# Patient Record
Sex: FEMALE | Race: OTHER | HISPANIC OR LATINO
[De-identification: names, ages, dates, MRNs, and addresses within clinical notes are randomized per-mention and may not be internally consistent; named-entity substitution may affect disease eponyms.]

---

## 2021-08-30 PROBLEM — Z00.00 ENCOUNTER FOR PREVENTIVE HEALTH EXAMINATION: Status: ACTIVE | Noted: 2021-08-30

## 2021-09-01 ENCOUNTER — APPOINTMENT (OUTPATIENT)
Dept: ANTEPARTUM | Facility: CLINIC | Age: 26
End: 2021-09-01
Payer: MEDICAID

## 2021-09-01 ENCOUNTER — ASOB RESULT (OUTPATIENT)
Age: 26
End: 2021-09-01

## 2021-09-01 ENCOUNTER — APPOINTMENT (OUTPATIENT)
Dept: ANTEPARTUM | Facility: CLINIC | Age: 26
End: 2021-09-01

## 2021-09-01 PROCEDURE — 76815 OB US LIMITED FETUS(S): CPT

## 2021-09-29 ENCOUNTER — ASOB RESULT (OUTPATIENT)
Age: 26
End: 2021-09-29

## 2021-09-29 ENCOUNTER — APPOINTMENT (OUTPATIENT)
Dept: ANTEPARTUM | Facility: CLINIC | Age: 26
End: 2021-09-29
Payer: MEDICAID

## 2021-09-29 PROCEDURE — 76811 OB US DETAILED SNGL FETUS: CPT

## 2021-09-29 PROCEDURE — 76817 TRANSVAGINAL US OBSTETRIC: CPT

## 2021-12-22 ENCOUNTER — APPOINTMENT (OUTPATIENT)
Dept: ANTEPARTUM | Facility: CLINIC | Age: 26
End: 2021-12-22
Payer: MEDICAID

## 2021-12-22 ENCOUNTER — ASOB RESULT (OUTPATIENT)
Age: 26
End: 2021-12-22

## 2021-12-22 PROCEDURE — 76819 FETAL BIOPHYS PROFIL W/O NST: CPT

## 2021-12-22 PROCEDURE — 76816 OB US FOLLOW-UP PER FETUS: CPT

## 2021-12-29 ENCOUNTER — OUTPATIENT (OUTPATIENT)
Dept: INPATIENT UNIT | Facility: HOSPITAL | Age: 26
LOS: 1 days | End: 2021-12-29
Payer: COMMERCIAL

## 2021-12-29 VITALS
HEART RATE: 93 BPM | RESPIRATION RATE: 18 BRPM | SYSTOLIC BLOOD PRESSURE: 117 MMHG | OXYGEN SATURATION: 96 % | TEMPERATURE: 98 F | DIASTOLIC BLOOD PRESSURE: 77 MMHG

## 2021-12-29 VITALS — HEART RATE: 88 BPM | DIASTOLIC BLOOD PRESSURE: 76 MMHG | SYSTOLIC BLOOD PRESSURE: 118 MMHG

## 2021-12-29 DIAGNOSIS — O47.03 FALSE LABOR BEFORE 37 COMPLETED WEEKS OF GESTATION, THIRD TRIMESTER: ICD-10-CM

## 2021-12-29 LAB
APPEARANCE UR: CLEAR — SIGNIFICANT CHANGE UP
BACTERIA # UR AUTO: ABNORMAL
BILIRUB UR-MCNC: NEGATIVE — SIGNIFICANT CHANGE UP
COLOR SPEC: YELLOW — SIGNIFICANT CHANGE UP
DIFF PNL FLD: ABNORMAL
EPI CELLS # UR: SIGNIFICANT CHANGE UP
GLUCOSE UR QL: NEGATIVE MG/DL — SIGNIFICANT CHANGE UP
KETONES UR-MCNC: NEGATIVE — SIGNIFICANT CHANGE UP
LEUKOCYTE ESTERASE UR-ACNC: NEGATIVE — SIGNIFICANT CHANGE UP
NITRITE UR-MCNC: NEGATIVE — SIGNIFICANT CHANGE UP
PH UR: 6.5 — SIGNIFICANT CHANGE UP (ref 5–8)
PROT UR-MCNC: NEGATIVE — SIGNIFICANT CHANGE UP
RBC CASTS # UR COMP ASSIST: ABNORMAL /HPF (ref 0–4)
SP GR SPEC: 1.01 — SIGNIFICANT CHANGE UP (ref 1.01–1.02)
UROBILINOGEN FLD QL: NEGATIVE MG/DL — SIGNIFICANT CHANGE UP
WBC UR QL: SIGNIFICANT CHANGE UP

## 2021-12-29 PROCEDURE — G0463: CPT

## 2021-12-29 PROCEDURE — 59025 FETAL NON-STRESS TEST: CPT

## 2021-12-29 PROCEDURE — 81001 URINALYSIS AUTO W/SCOPE: CPT

## 2021-12-29 PROCEDURE — 76819 FETAL BIOPHYS PROFIL W/O NST: CPT

## 2021-12-29 NOTE — OB RN TRIAGE NOTE - CHIEF COMPLAINT QUOTE
pain in belly for a few days and blood clots pain in belly for a few days and vaginal bleeding for 5 days but none today

## 2021-12-29 NOTE — OB PROVIDER TRIAGE NOTE - HISTORY OF PRESENT ILLNESS
DARRIN SIEGEL is a 26y  at 33.4 weeks GA who presents to L&D with pelvic pain x 1 day- worse with walking. She reports vaginal bleeding with clots a few days ago- resolved.   Pt denies contractions and leakage of fluid. She endorses good fetal movement.   She denies any urinary complaints.   She denies fevers, chills, nausea, vomiting.       Pregnancy course is significant for:  H/o preeclampsia in previous pregnancy     POB:  G1- -FT   G2- sab   PGYN: -fibroids/-cysts, denied STD hx, denies abnormal PAPs  PMH: none  PSH: D&C, leg surgery  SH: Denies tobacco use, EtOH use and illicit drug use during the pregnancy; Feels safe at home  Meds: PNV  All: NKDA

## 2021-12-29 NOTE — OB RN TRIAGE NOTE - FALL HARM RISK - UNIVERSAL INTERVENTIONS
Bed in lowest position, wheels locked, appropriate side rails in place/Call bell, personal items and telephone in reach/Instruct patient to call for assistance before getting out of bed or chair/Non-slip footwear when patient is out of bed/[place] to call system/Physically safe environment - no spills, clutter or unnecessary equipment/Purposeful Proactive Rounding/Room/bathroom lighting operational, light cord in reach

## 2021-12-29 NOTE — OB PROVIDER TRIAGE NOTE - NSOBPROVIDERNOTE_OBGYN_ALL_OB_FT
A/P:   DARRIN ISEGEL is a 26y  at 33.4 weeks GA who presents to L&D with pelvic pain x 1 day- worse with walking.   No clinical signs of PTL at this time.   UA pending  Pt declining medication for pain at this time- states she has tylenol at home.   NST reactive  Dispo: Continue to observe- when UA results- stable for d/c home with follow-up at [facility] clinic this week.    Dr. Mac present at bedside A/P:   DARRIN SIEGEL is a 26y  at 33.4 weeks GA who presents to L&D with pelvic pain x 1 day- worse with walking.   No clinical signs of PTL at this time.   UA clear  Pt declining medication for pain at this time- states she has tylenol at home.   NST reactive  Stable for d/c home with follow-up at [facility] clinic this week.    Dr. Mac present at bedside

## 2021-12-29 NOTE — OB PROVIDER TRIAGE NOTE - NSHPPHYSICALEXAM_GEN_ALL_CORE
T(C): 36.7 (12-29-21 @ 15:29), Max: 36.7 (12-29-21 @ 15:19)  HR: 89 (12-29-21 @ 16:48) (85 - 102)  BP: 117/77 (12-29-21 @ 15:24) (117/77 - 117/77)  RR: 18 (12-29-21 @ 15:29) (18 - 18)  SpO2: 97% (12-29-21 @ 16:48) (95% - 98%)  Gen: NAD, well-appearing  Heart: RRR  Lungs: CTAB  Abd: soft, gravid  Ext: non-edematous, non-tender   Pelvic: Transvaginal sono performed- CL 3.12cm, cervix closed- no bleeding noted  FHT: 140 baseline, moderate variability, +accels, no decels   [place]: no contraction, mild uterine irritability during TVUS  Abdominal US: vertex, anterior placenta, [place] 14, +FM

## 2022-01-19 ENCOUNTER — APPOINTMENT (OUTPATIENT)
Dept: ANTEPARTUM | Facility: CLINIC | Age: 27
End: 2022-01-19

## 2022-01-26 PROBLEM — Z78.9 OTHER SPECIFIED HEALTH STATUS: Chronic | Status: ACTIVE | Noted: 2021-12-29

## 2022-01-28 ENCOUNTER — ASOB RESULT (OUTPATIENT)
Age: 27
End: 2022-01-28

## 2022-01-28 ENCOUNTER — APPOINTMENT (OUTPATIENT)
Dept: ANTEPARTUM | Facility: CLINIC | Age: 27
End: 2022-01-28
Payer: MEDICAID

## 2022-01-28 PROCEDURE — 76816 OB US FOLLOW-UP PER FETUS: CPT

## 2022-01-28 PROCEDURE — 76819 FETAL BIOPHYS PROFIL W/O NST: CPT

## 2022-02-12 ENCOUNTER — OUTPATIENT (OUTPATIENT)
Dept: OUTPATIENT SERVICES | Facility: HOSPITAL | Age: 27
LOS: 1 days | End: 2022-02-12
Payer: COMMERCIAL

## 2022-02-12 DIAGNOSIS — Z20.828 CONTACT WITH AND (SUSPECTED) EXPOSURE TO OTHER VIRAL COMMUNICABLE DISEASES: ICD-10-CM

## 2022-02-12 LAB — SARS-COV-2 RNA SPEC QL NAA+PROBE: DETECTED

## 2022-02-12 RX ORDER — AMPICILLIN TRIHYDRATE 250 MG
2 CAPSULE ORAL ONCE
Refills: 0 | Status: COMPLETED | OUTPATIENT
Start: 2022-02-14 | End: 2022-02-14

## 2022-02-12 RX ORDER — CITRIC ACID/SODIUM CITRATE 300-500 MG
30 SOLUTION, ORAL ORAL ONCE
Refills: 0 | Status: DISCONTINUED | OUTPATIENT
Start: 2022-02-14 | End: 2022-02-15

## 2022-02-12 RX ORDER — OXYTOCIN 10 UNIT/ML
333.33 VIAL (ML) INJECTION
Qty: 20 | Refills: 0 | Status: COMPLETED | OUTPATIENT
Start: 2022-02-14 | End: 2022-02-14

## 2022-02-12 RX ORDER — AMPICILLIN TRIHYDRATE 250 MG
1 CAPSULE ORAL EVERY 4 HOURS
Refills: 0 | Status: DISCONTINUED | OUTPATIENT
Start: 2022-02-14 | End: 2022-02-14

## 2022-02-12 RX ORDER — SODIUM CHLORIDE 9 MG/ML
1000 INJECTION, SOLUTION INTRAVENOUS
Refills: 0 | Status: DISCONTINUED | OUTPATIENT
Start: 2022-02-14 | End: 2022-02-15

## 2022-02-12 NOTE — OB PROVIDER H&P - HISTORY OF PRESENT ILLNESS
Patient is a 26 year old  at 40w2d who presents to L&D for eIOL          JADON: 22     LMP: 21          Pregnancy course:     Hx of Preeclampsia     GBS pos          Obhx: G1: 3/11/14: , M, FT, 7lbs c/b PEC     G2: 2019, 8wks     Gynhx: denies fibroids, +ovarian cysts, no abnormal pap     Pmhx: denies     Pshx: D&C (2019), foot surgery (R)     Meds: ASA 81mg, PNV     Allergies: NKDA     SocialHx: denies x 3          BMI: 22.86     Ultrasound: Vertex, Anterior      EFW:2960g   Patient is a 26 year old  at 40w2d who presents to L&D for eIOL. She denies any vb,  or LOF, +FM. Irregular ctx.     JADON: 22   LMP: 21          Pregnancy course:   Hx of Preeclampsia   GBS pos     Obhx:   G1: 3/11/14: , M, FT, 7lbs c/b PEC   G2: SAB , 8wks     Gynhx: denies fibroids, +ovarian cysts, no abnormal pap   Pmhx: denies   Pshx: D&C (2019), foot surgery (R)   Meds: ASA 81mg, PNV   Allergies: NKDA   SocialHx: denies x 3       BMI: 22.86   Ultrasound: Vertex, Anterior    EFW:2960g

## 2022-02-12 NOTE — OB PROVIDER H&P - ASSESSMENT
Patient is a 26 year old  at 40w2d who is admitted to L&D for eIOL    Patient is a 26 year old  at 40w2d who is admitted to L&D for eIOL     - Admit to L&D  - Consent  - Admission Labs  - IV fluids   - Continuous toco and fetal monitoring   - GBS positive, will prophylax   - Pitocin IOL     Discussed with Dr. Mac

## 2022-02-12 NOTE — OB PROVIDER H&P - NSHPPHYSICALEXAM_GEN_ALL_CORE
Vital Signs Last 24 Hrs  T(C): 36.8 (14 Feb 2022 09:23), Max: 36.8 (14 Feb 2022 09:19)  T(F): 98.24 (14 Feb 2022 09:23), Max: 98.24 (14 Feb 2022 09:23)  HR: 95 (14 Feb 2022 09:24) (95 - 95)  BP: 121/79 (14 Feb 2022 09:24) (121/79 - 121/79)  RR: 18 (14 Feb 2022 09:23) (18 - 18)    General: NAD, well nourished  CV: RRR  Lungs: CTAB  Abdomen: Gravid, non tender  Ext: no dvt signs     FHT: 150 baseline, moderate variability, +accels, intermittent variable decels  [place]: Irregular ctx   Bedside sono: vertex, anterior placenta

## 2022-02-14 ENCOUNTER — INPATIENT (INPATIENT)
Facility: HOSPITAL | Age: 27
LOS: 1 days | Discharge: HOME | End: 2022-02-16
Attending: OBSTETRICS & GYNECOLOGY | Admitting: OBSTETRICS & GYNECOLOGY
Payer: COMMERCIAL

## 2022-02-14 VITALS
HEIGHT: 62.99 IN | DIASTOLIC BLOOD PRESSURE: 79 MMHG | SYSTOLIC BLOOD PRESSURE: 121 MMHG | RESPIRATION RATE: 18 BRPM | WEIGHT: 160.06 LBS | HEART RATE: 95 BPM | TEMPERATURE: 98 F

## 2022-02-14 LAB
ABO RH CONFIRMATION: SIGNIFICANT CHANGE UP
ALBUMIN SERPL ELPH-MCNC: 3.5 G/DL — SIGNIFICANT CHANGE UP (ref 3.3–5.2)
ALP SERPL-CCNC: 283 U/L — HIGH (ref 40–120)
ALT FLD-CCNC: 11 U/L — SIGNIFICANT CHANGE UP
ANION GAP SERPL CALC-SCNC: 12 MMOL/L — SIGNIFICANT CHANGE UP (ref 5–17)
APPEARANCE UR: CLEAR — SIGNIFICANT CHANGE UP
APTT BLD: 29.5 SEC — SIGNIFICANT CHANGE UP (ref 27.5–35.5)
AST SERPL-CCNC: 20 U/L — SIGNIFICANT CHANGE UP
BACTERIA # UR AUTO: ABNORMAL
BASOPHILS # BLD AUTO: 0.03 K/UL — SIGNIFICANT CHANGE UP (ref 0–0.2)
BASOPHILS NFR BLD AUTO: 0.4 % — SIGNIFICANT CHANGE UP (ref 0–2)
BILIRUB SERPL-MCNC: 0.2 MG/DL — LOW (ref 0.4–2)
BILIRUB UR-MCNC: NEGATIVE — SIGNIFICANT CHANGE UP
BLD GP AB SCN SERPL QL: SIGNIFICANT CHANGE UP
BUN SERPL-MCNC: 6.4 MG/DL — LOW (ref 8–20)
CALCIUM SERPL-MCNC: 8.7 MG/DL — SIGNIFICANT CHANGE UP (ref 8.6–10.2)
CHLORIDE SERPL-SCNC: 105 MMOL/L — SIGNIFICANT CHANGE UP (ref 98–107)
CO2 SERPL-SCNC: 20 MMOL/L — LOW (ref 22–29)
COLOR SPEC: YELLOW — SIGNIFICANT CHANGE UP
COVID-19 SPIKE DOMAIN AB INTERP: POSITIVE
COVID-19 SPIKE DOMAIN ANTIBODY RESULT: >250 U/ML — HIGH
CREAT ?TM UR-MCNC: 61 MG/DL — SIGNIFICANT CHANGE UP
CREAT SERPL-MCNC: 0.44 MG/DL — LOW (ref 0.5–1.3)
DIFF PNL FLD: ABNORMAL
EOSINOPHIL # BLD AUTO: 0.05 K/UL — SIGNIFICANT CHANGE UP (ref 0–0.5)
EOSINOPHIL NFR BLD AUTO: 0.7 % — SIGNIFICANT CHANGE UP (ref 0–6)
EPI CELLS # UR: SIGNIFICANT CHANGE UP
FIBRINOGEN PPP-MCNC: 654 MG/DL — HIGH (ref 290–520)
GLUCOSE SERPL-MCNC: 82 MG/DL — SIGNIFICANT CHANGE UP (ref 70–99)
GLUCOSE UR QL: NEGATIVE MG/DL — SIGNIFICANT CHANGE UP
HCT VFR BLD CALC: 30.8 % — LOW (ref 34.5–45)
HGB BLD-MCNC: 9.4 G/DL — LOW (ref 11.5–15.5)
IMM GRANULOCYTES NFR BLD AUTO: 1.4 % — SIGNIFICANT CHANGE UP (ref 0–1.5)
INR BLD: 0.95 RATIO — SIGNIFICANT CHANGE UP (ref 0.88–1.16)
KETONES UR-MCNC: NEGATIVE — SIGNIFICANT CHANGE UP
LDH SERPL L TO P-CCNC: 171 U/L — SIGNIFICANT CHANGE UP (ref 98–192)
LEUKOCYTE ESTERASE UR-ACNC: NEGATIVE — SIGNIFICANT CHANGE UP
LYMPHOCYTES # BLD AUTO: 2.32 K/UL — SIGNIFICANT CHANGE UP (ref 1–3.3)
LYMPHOCYTES # BLD AUTO: 32.5 % — SIGNIFICANT CHANGE UP (ref 13–44)
MCHC RBC-ENTMCNC: 24.7 PG — LOW (ref 27–34)
MCHC RBC-ENTMCNC: 30.5 GM/DL — LOW (ref 32–36)
MCV RBC AUTO: 81.1 FL — SIGNIFICANT CHANGE UP (ref 80–100)
MONOCYTES # BLD AUTO: 0.58 K/UL — SIGNIFICANT CHANGE UP (ref 0–0.9)
MONOCYTES NFR BLD AUTO: 8.1 % — SIGNIFICANT CHANGE UP (ref 2–14)
NEUTROPHILS # BLD AUTO: 4.05 K/UL — SIGNIFICANT CHANGE UP (ref 1.8–7.4)
NEUTROPHILS NFR BLD AUTO: 56.9 % — SIGNIFICANT CHANGE UP (ref 43–77)
NITRITE UR-MCNC: NEGATIVE — SIGNIFICANT CHANGE UP
PH UR: 8 — SIGNIFICANT CHANGE UP (ref 5–8)
PLATELET # BLD AUTO: 275 K/UL — SIGNIFICANT CHANGE UP (ref 150–400)
POTASSIUM SERPL-MCNC: 4.2 MMOL/L — SIGNIFICANT CHANGE UP (ref 3.5–5.3)
POTASSIUM SERPL-SCNC: 4.2 MMOL/L — SIGNIFICANT CHANGE UP (ref 3.5–5.3)
PROT ?TM UR-MCNC: 14 MG/DL — HIGH (ref 0–12)
PROT SERPL-MCNC: 6.8 G/DL — SIGNIFICANT CHANGE UP (ref 6.6–8.7)
PROT UR-MCNC: NEGATIVE — SIGNIFICANT CHANGE UP
PROT/CREAT UR-RTO: 0.2 RATIO — SIGNIFICANT CHANGE UP
PROTHROM AB SERPL-ACNC: 11.1 SEC — SIGNIFICANT CHANGE UP (ref 10.6–13.6)
RBC # BLD: 3.8 M/UL — SIGNIFICANT CHANGE UP (ref 3.8–5.2)
RBC # FLD: 14.3 % — SIGNIFICANT CHANGE UP (ref 10.3–14.5)
RBC CASTS # UR COMP ASSIST: ABNORMAL /HPF (ref 0–4)
SARS-COV-2 IGG+IGM SERPL QL IA: >250 U/ML — HIGH
SARS-COV-2 IGG+IGM SERPL QL IA: POSITIVE
SODIUM SERPL-SCNC: 137 MMOL/L — SIGNIFICANT CHANGE UP (ref 135–145)
SP GR SPEC: 1.01 — SIGNIFICANT CHANGE UP (ref 1.01–1.02)
URATE SERPL-MCNC: 4 MG/DL — SIGNIFICANT CHANGE UP (ref 2.4–5.7)
UROBILINOGEN FLD QL: NEGATIVE MG/DL — SIGNIFICANT CHANGE UP
WBC # BLD: 7.13 K/UL — SIGNIFICANT CHANGE UP (ref 3.8–10.5)
WBC # FLD AUTO: 7.13 K/UL — SIGNIFICANT CHANGE UP (ref 3.8–10.5)
WBC UR QL: NEGATIVE /HPF — SIGNIFICANT CHANGE UP (ref 0–5)

## 2022-02-14 RX ORDER — AMPICILLIN TRIHYDRATE 250 MG
CAPSULE ORAL
Refills: 0 | Status: DISCONTINUED | OUTPATIENT
Start: 2022-02-15 | End: 2022-02-16

## 2022-02-14 RX ORDER — OXYTOCIN 10 UNIT/ML
VIAL (ML) INJECTION
Qty: 30 | Refills: 0 | Status: DISCONTINUED | OUTPATIENT
Start: 2022-02-14 | End: 2022-02-15

## 2022-02-14 RX ORDER — AMPICILLIN TRIHYDRATE 250 MG
2 CAPSULE ORAL ONCE
Refills: 0 | Status: COMPLETED | OUTPATIENT
Start: 2022-02-14 | End: 2022-02-15

## 2022-02-14 RX ORDER — AMPICILLIN TRIHYDRATE 250 MG
2 CAPSULE ORAL EVERY 6 HOURS
Refills: 0 | Status: DISCONTINUED | OUTPATIENT
Start: 2022-02-15 | End: 2022-02-16

## 2022-02-14 RX ORDER — SODIUM CHLORIDE 9 MG/ML
1000 INJECTION, SOLUTION INTRAVENOUS ONCE
Refills: 0 | Status: COMPLETED | OUTPATIENT
Start: 2022-02-14 | End: 2022-02-14

## 2022-02-14 RX ORDER — GENTAMICIN SULFATE 40 MG/ML
300 VIAL (ML) INJECTION ONCE
Refills: 0 | Status: DISCONTINUED | OUTPATIENT
Start: 2022-02-14 | End: 2022-02-15

## 2022-02-14 RX ORDER — ACETAMINOPHEN 500 MG
975 TABLET ORAL ONCE
Refills: 0 | Status: COMPLETED | OUTPATIENT
Start: 2022-02-14 | End: 2022-02-14

## 2022-02-14 RX ADMIN — Medication 975 MILLIGRAM(S): at 20:10

## 2022-02-14 RX ADMIN — Medication 108 GRAM(S): at 18:43

## 2022-02-14 RX ADMIN — Medication 2 MILLIUNIT(S)/MIN: at 14:58

## 2022-02-14 RX ADMIN — Medication 2 MILLIUNIT(S)/MIN: at 20:28

## 2022-02-14 RX ADMIN — Medication 108 GRAM(S): at 23:18

## 2022-02-14 RX ADMIN — SODIUM CHLORIDE 125 MILLILITER(S): 9 INJECTION, SOLUTION INTRAVENOUS at 20:27

## 2022-02-14 RX ADMIN — Medication 975 MILLIGRAM(S): at 21:06

## 2022-02-14 RX ADMIN — SODIUM CHLORIDE 1000 MILLILITER(S): 9 INJECTION, SOLUTION INTRAVENOUS at 19:40

## 2022-02-14 RX ADMIN — SODIUM CHLORIDE 125 MILLILITER(S): 9 INJECTION, SOLUTION INTRAVENOUS at 15:00

## 2022-02-14 RX ADMIN — SODIUM CHLORIDE 125 MILLILITER(S): 9 INJECTION, SOLUTION INTRAVENOUS at 21:27

## 2022-02-14 RX ADMIN — Medication 216 GRAM(S): at 14:53

## 2022-02-14 NOTE — OB RN PATIENT PROFILE - FALL HARM RISK - UNIVERSAL INTERVENTIONS
Bed in lowest position, wheels locked, appropriate side rails in place/Call bell, personal items and telephone in reach/Instruct patient to call for assistance before getting out of bed or chair/Non-slip footwear when patient is out of bed/[place] to call system/Physically safe environment - no spills, clutter or unnecessary equipment/Purposeful Proactive Rounding/Room/bathroom lighting operational, light cord in reach

## 2022-02-14 NOTE — CHART NOTE - NSCHARTNOTEFT_GEN_A_CORE
elective IOL at 40 weeks   hx of preeclampsia  no complaints  BP normal  exam on admission 3/50/-2.intact  tracing reactive  We have been unable to start pitocin as there is no nurse available  the nurse manager were notified

## 2022-02-15 ENCOUNTER — RESULT REVIEW (OUTPATIENT)
Age: 27
End: 2022-02-15

## 2022-02-15 LAB
MEV IGG SER-ACNC: 10.7 AU/ML — SIGNIFICANT CHANGE UP
MEV IGG+IGM SER-IMP: NEGATIVE — SIGNIFICANT CHANGE UP
T PALLIDUM AB TITR SER: NEGATIVE — SIGNIFICANT CHANGE UP

## 2022-02-15 PROCEDURE — U0005: CPT

## 2022-02-15 PROCEDURE — U0003: CPT

## 2022-02-15 PROCEDURE — 88307 TISSUE EXAM BY PATHOLOGIST: CPT | Mod: 26

## 2022-02-15 RX ORDER — BENZOCAINE 10 %
1 GEL (GRAM) MUCOUS MEMBRANE EVERY 6 HOURS
Refills: 0 | Status: DISCONTINUED | OUTPATIENT
Start: 2022-02-15 | End: 2022-02-16

## 2022-02-15 RX ORDER — ACETAMINOPHEN 500 MG
975 TABLET ORAL
Refills: 0 | Status: DISCONTINUED | OUTPATIENT
Start: 2022-02-15 | End: 2022-02-16

## 2022-02-15 RX ORDER — HYDROCORTISONE 1 %
1 OINTMENT (GRAM) TOPICAL EVERY 6 HOURS
Refills: 0 | Status: DISCONTINUED | OUTPATIENT
Start: 2022-02-15 | End: 2022-02-16

## 2022-02-15 RX ORDER — OXYTOCIN 10 UNIT/ML
VIAL (ML) INJECTION
Qty: 30 | Refills: 0 | Status: DISCONTINUED | OUTPATIENT
Start: 2022-02-15 | End: 2022-02-15

## 2022-02-15 RX ORDER — PRAMOXINE HYDROCHLORIDE 150 MG/15G
1 AEROSOL, FOAM RECTAL EVERY 4 HOURS
Refills: 0 | Status: DISCONTINUED | OUTPATIENT
Start: 2022-02-15 | End: 2022-02-16

## 2022-02-15 RX ORDER — IBUPROFEN 200 MG
600 TABLET ORAL EVERY 6 HOURS
Refills: 0 | Status: COMPLETED | OUTPATIENT
Start: 2022-02-15 | End: 2023-01-14

## 2022-02-15 RX ORDER — OXYCODONE HYDROCHLORIDE 5 MG/1
5 TABLET ORAL ONCE
Refills: 0 | Status: DISCONTINUED | OUTPATIENT
Start: 2022-02-15 | End: 2022-02-16

## 2022-02-15 RX ORDER — TETANUS TOXOID, REDUCED DIPHTHERIA TOXOID AND ACELLULAR PERTUSSIS VACCINE, ADSORBED 5; 2.5; 8; 8; 2.5 [IU]/.5ML; [IU]/.5ML; UG/.5ML; UG/.5ML; UG/.5ML
0.5 SUSPENSION INTRAMUSCULAR ONCE
Refills: 0 | Status: DISCONTINUED | OUTPATIENT
Start: 2022-02-15 | End: 2022-02-16

## 2022-02-15 RX ORDER — GENTAMICIN SULFATE 40 MG/ML
300 VIAL (ML) INJECTION ONCE
Refills: 0 | Status: COMPLETED | OUTPATIENT
Start: 2022-02-15 | End: 2022-02-15

## 2022-02-15 RX ORDER — SODIUM CHLORIDE 9 MG/ML
3 INJECTION INTRAMUSCULAR; INTRAVENOUS; SUBCUTANEOUS EVERY 8 HOURS
Refills: 0 | Status: DISCONTINUED | OUTPATIENT
Start: 2022-02-15 | End: 2022-02-16

## 2022-02-15 RX ORDER — DIPHENHYDRAMINE HCL 50 MG
25 CAPSULE ORAL EVERY 6 HOURS
Refills: 0 | Status: DISCONTINUED | OUTPATIENT
Start: 2022-02-15 | End: 2022-02-16

## 2022-02-15 RX ORDER — MAGNESIUM HYDROXIDE 400 MG/1
30 TABLET, CHEWABLE ORAL
Refills: 0 | Status: DISCONTINUED | OUTPATIENT
Start: 2022-02-15 | End: 2022-02-16

## 2022-02-15 RX ORDER — LANOLIN
1 OINTMENT (GRAM) TOPICAL EVERY 6 HOURS
Refills: 0 | Status: DISCONTINUED | OUTPATIENT
Start: 2022-02-15 | End: 2022-02-16

## 2022-02-15 RX ORDER — KETOROLAC TROMETHAMINE 30 MG/ML
30 SYRINGE (ML) INJECTION ONCE
Refills: 0 | Status: DISCONTINUED | OUTPATIENT
Start: 2022-02-15 | End: 2022-02-15

## 2022-02-15 RX ORDER — OXYTOCIN 10 UNIT/ML
333.33 VIAL (ML) INJECTION
Qty: 20 | Refills: 0 | Status: DISCONTINUED | OUTPATIENT
Start: 2022-02-15 | End: 2022-02-16

## 2022-02-15 RX ORDER — AER TRAVELER 0.5 G/1
1 SOLUTION RECTAL; TOPICAL EVERY 4 HOURS
Refills: 0 | Status: DISCONTINUED | OUTPATIENT
Start: 2022-02-15 | End: 2022-02-16

## 2022-02-15 RX ORDER — SIMETHICONE 80 MG/1
80 TABLET, CHEWABLE ORAL EVERY 4 HOURS
Refills: 0 | Status: DISCONTINUED | OUTPATIENT
Start: 2022-02-15 | End: 2022-02-16

## 2022-02-15 RX ORDER — OXYCODONE HYDROCHLORIDE 5 MG/1
5 TABLET ORAL
Refills: 0 | Status: DISCONTINUED | OUTPATIENT
Start: 2022-02-15 | End: 2022-02-16

## 2022-02-15 RX ORDER — DIBUCAINE 1 %
1 OINTMENT (GRAM) RECTAL EVERY 6 HOURS
Refills: 0 | Status: DISCONTINUED | OUTPATIENT
Start: 2022-02-15 | End: 2022-02-16

## 2022-02-15 RX ORDER — IBUPROFEN 200 MG
600 TABLET ORAL EVERY 6 HOURS
Refills: 0 | Status: DISCONTINUED | OUTPATIENT
Start: 2022-02-15 | End: 2022-02-16

## 2022-02-15 RX ADMIN — Medication 1 TABLET(S): at 12:53

## 2022-02-15 RX ADMIN — Medication 975 MILLIGRAM(S): at 15:05

## 2022-02-15 RX ADMIN — Medication 216 GRAM(S): at 06:32

## 2022-02-15 RX ADMIN — Medication 975 MILLIGRAM(S): at 09:41

## 2022-02-15 RX ADMIN — Medication 300 MILLIGRAM(S): at 00:43

## 2022-02-15 RX ADMIN — Medication 600 MILLIGRAM(S): at 23:51

## 2022-02-15 RX ADMIN — Medication 600 MILLIGRAM(S): at 12:53

## 2022-02-15 RX ADMIN — SODIUM CHLORIDE 3 MILLILITER(S): 9 INJECTION INTRAMUSCULAR; INTRAVENOUS; SUBCUTANEOUS at 21:05

## 2022-02-15 RX ADMIN — Medication 600 MILLIGRAM(S): at 19:22

## 2022-02-15 RX ADMIN — Medication 1000 MILLIUNIT(S)/MIN: at 04:38

## 2022-02-15 RX ADMIN — Medication 216 GRAM(S): at 17:29

## 2022-02-15 RX ADMIN — Medication 216 GRAM(S): at 00:05

## 2022-02-15 RX ADMIN — Medication 975 MILLIGRAM(S): at 20:35

## 2022-02-15 RX ADMIN — SODIUM CHLORIDE 3 MILLILITER(S): 9 INJECTION INTRAMUSCULAR; INTRAVENOUS; SUBCUTANEOUS at 14:51

## 2022-02-15 RX ADMIN — Medication 600 MILLIGRAM(S): at 18:22

## 2022-02-15 RX ADMIN — Medication 975 MILLIGRAM(S): at 21:10

## 2022-02-15 RX ADMIN — Medication 975 MILLIGRAM(S): at 16:00

## 2022-02-15 RX ADMIN — Medication 600 MILLIGRAM(S): at 13:51

## 2022-02-15 RX ADMIN — Medication 975 MILLIGRAM(S): at 10:30

## 2022-02-15 RX ADMIN — Medication 216 GRAM(S): at 12:53

## 2022-02-15 RX ADMIN — Medication 30 MILLIGRAM(S): at 04:14

## 2022-02-15 RX ADMIN — Medication 216 GRAM(S): at 23:51

## 2022-02-15 RX ADMIN — Medication 0.2 MILLIGRAM(S): at 03:38

## 2022-02-15 NOTE — OB PROVIDER LABOR PROGRESS NOTE - NS_OBIHIFHRDETAILS_OBGYN_ALL_OB_FT
155 moderate variability, + accelerations, n o decelertiaons
early decels noted, mod variability, +accels
FHT: baseline 155, mod variability, +accels, variable decels

## 2022-02-15 NOTE — OB RN DELIVERY SUMMARY - NSSELHIDDEN_OBGYN_ALL_OB_FT
[NS_DeliveryAttending1_OBGYN_ALL_OB_FT:[email or web address]=],[NS_DeliveryRN_OBGYN_ALL_OB_FT:[id number]=]

## 2022-02-15 NOTE — OB RN DELIVERY SUMMARY - NS_SEPSISRSKCALC_OBGYN_ALL_OB_FT
EOS calculated successfully. EOS Risk Factor: 0.5/1000 live births ([facility] national incidence); GA=40w3d; Temp=100.76; ROM=4.55; GBS='Positive'; Antibiotics='Broad spectrum antibiotics 2-3.9 hrs prior to birth'   EOS calculated successfully. EOS Risk Factor: 0.5/1000 live births ([facility] national incidence); GA=40w3d; Temp=100.76; ROM=4.783; GBS='Positive'; Antibiotics='Broad spectrum antibiotics 2-3.9 hrs prior to birth'

## 2022-02-15 NOTE — OB PROVIDER LABOR PROGRESS NOTE - NS_SUBJECTIVE/OBJECTIVE_OBGYN_ALL_OB_FT
pt comfortable w epi    Vital Signs Last 24 Hrs  T(C): 37.1 (14 Feb 2022 21:57), Max: 37.5 (14 Feb 2022 18:44)  T(F): 98.78 (14 Feb 2022 21:57), Max: 99.5 (14 Feb 2022 18:44)  HR: 80 (14 Feb 2022 22:42) (72 - 124)  BP: 109/58 (14 Feb 2022 22:42) (101/65 - 142/75)  RR: 18 (14 Feb 2022 19:50) (18 - 20)  SpO2: 100% (14 Feb 2022 21:23) (98% - 100%)
patient examined at bedside for severe pain
pt feeling increased pressure    Vital Signs Last 24 Hrs  T(C): 37.3 (15 Feb 2022 02:15), Max: 38.2 (14 Feb 2022 23:26)  T(F): 99.14 (15 Feb 2022 02:15), Max: 100.76 (14 Feb 2022 23:26)  HR: 77 (15 Feb 2022 02:27) (66 - 124)  BP: 124/73 (15 Feb 2022 02:27) (99/56 - 142/75)  RR: 18 (14 Feb 2022 19:50) (18 - 20)  SpO2: 100% (14 Feb 2022 21:23) (98% - 100%)

## 2022-02-15 NOTE — OB PROVIDER LABOR PROGRESS NOTE - ASSESSMENT
VSS  cat 1 FHT  on pit. increase as tolerated  continuous fetal and toco monitoring   fully, +1, set up to start pushing
AROM clear @time of exam  pit @8. modify as necessary per fht. reposition and resuscitate as necessary  continuous fetal and toco monitoring    discussed with Dr. Mac
patient is very uncomfortable in pain,  on pit for augmentation  will proceed with epidural anesthesia

## 2022-02-15 NOTE — OB PROVIDER DELIVERY SUMMARY - NSSELHIDDEN_OBGYN_ALL_OB_FT
[NS_DeliveryAttending1_OBGYN_ALL_OB_FT:[email or web address]=],[NS_DeliveryRN_OBGYN_ALL_OB_FT:[id number]=]

## 2022-02-15 NOTE — OB RN DELIVERY SUMMARY - NS_LABORCHARACTER_OBGYN_ALL_OB
Induction of labor-AROM/Induction of labor-Medicinal/Febrile (>38C)/External electronic FM/Antibiotics in labor/Meconium staining

## 2022-02-15 NOTE — OB PROVIDER DELIVERY SUMMARY - NSPROVIDERDELIVERYNOTE_OBGYN_ALL_OB_FT
Pt was fully dilated. Pushed effectively, delivered via  @0332, f, apgars 9/9. Delivered ROP through nuchal x1.  Delivered placenta @0334, intact.  uterine atony noted, improved s/p methergine 0.2 x1  Vagina and perineum inspected, no lacerations noted.  .   Hemostatic.  Pt and infant recovering in LDR.  Count correct.

## 2022-02-16 ENCOUNTER — TRANSCRIPTION ENCOUNTER (OUTPATIENT)
Age: 27
End: 2022-02-16

## 2022-02-16 VITALS
TEMPERATURE: 99 F | SYSTOLIC BLOOD PRESSURE: 110 MMHG | OXYGEN SATURATION: 98 % | HEART RATE: 82 BPM | DIASTOLIC BLOOD PRESSURE: 73 MMHG | RESPIRATION RATE: 16 BRPM

## 2022-02-16 LAB
BASOPHILS # BLD AUTO: 0.04 K/UL — SIGNIFICANT CHANGE UP (ref 0–0.2)
BASOPHILS NFR BLD AUTO: 0.3 % — SIGNIFICANT CHANGE UP (ref 0–2)
EOSINOPHIL # BLD AUTO: 0.06 K/UL — SIGNIFICANT CHANGE UP (ref 0–0.5)
EOSINOPHIL NFR BLD AUTO: 0.5 % — SIGNIFICANT CHANGE UP (ref 0–6)
HCT VFR BLD CALC: 25.9 % — LOW (ref 34.5–45)
HGB BLD-MCNC: 7.8 G/DL — LOW (ref 11.5–15.5)
IMM GRANULOCYTES NFR BLD AUTO: 0.9 % — SIGNIFICANT CHANGE UP (ref 0–1.5)
LYMPHOCYTES # BLD AUTO: 24.1 % — SIGNIFICANT CHANGE UP (ref 13–44)
LYMPHOCYTES # BLD AUTO: 3.19 K/UL — SIGNIFICANT CHANGE UP (ref 1–3.3)
MCHC RBC-ENTMCNC: 24.7 PG — LOW (ref 27–34)
MCHC RBC-ENTMCNC: 30.1 GM/DL — LOW (ref 32–36)
MCV RBC AUTO: 82 FL — SIGNIFICANT CHANGE UP (ref 80–100)
MONOCYTES # BLD AUTO: 0.59 K/UL — SIGNIFICANT CHANGE UP (ref 0–0.9)
MONOCYTES NFR BLD AUTO: 4.5 % — SIGNIFICANT CHANGE UP (ref 2–14)
NEUTROPHILS # BLD AUTO: 9.23 K/UL — HIGH (ref 1.8–7.4)
NEUTROPHILS NFR BLD AUTO: 69.7 % — SIGNIFICANT CHANGE UP (ref 43–77)
PLATELET # BLD AUTO: 199 K/UL — SIGNIFICANT CHANGE UP (ref 150–400)
RBC # BLD: 3.16 M/UL — LOW (ref 3.8–5.2)
RBC # FLD: 14.6 % — HIGH (ref 10.3–14.5)
WBC # BLD: 13.23 K/UL — HIGH (ref 3.8–10.5)
WBC # FLD AUTO: 13.23 K/UL — HIGH (ref 3.8–10.5)

## 2022-02-16 PROCEDURE — 85384 FIBRINOGEN ACTIVITY: CPT

## 2022-02-16 PROCEDURE — 86901 BLOOD TYPING SEROLOGIC RH(D): CPT

## 2022-02-16 PROCEDURE — 88307 TISSUE EXAM BY PATHOLOGIST: CPT

## 2022-02-16 PROCEDURE — G0463: CPT

## 2022-02-16 PROCEDURE — 86850 RBC ANTIBODY SCREEN: CPT

## 2022-02-16 PROCEDURE — 84550 ASSAY OF BLOOD/URIC ACID: CPT

## 2022-02-16 PROCEDURE — 85025 COMPLETE CBC W/AUTO DIFF WBC: CPT

## 2022-02-16 PROCEDURE — 83615 LACTATE (LD) (LDH) ENZYME: CPT

## 2022-02-16 PROCEDURE — 80053 COMPREHEN METABOLIC PANEL: CPT

## 2022-02-16 PROCEDURE — 85610 PROTHROMBIN TIME: CPT

## 2022-02-16 PROCEDURE — 86769 SARS-COV-2 COVID-19 ANTIBODY: CPT

## 2022-02-16 PROCEDURE — 84156 ASSAY OF PROTEIN URINE: CPT

## 2022-02-16 PROCEDURE — 59050 FETAL MONITOR W/REPORT: CPT

## 2022-02-16 PROCEDURE — 86765 RUBEOLA ANTIBODY: CPT

## 2022-02-16 PROCEDURE — 59025 FETAL NON-STRESS TEST: CPT

## 2022-02-16 PROCEDURE — 36415 COLL VENOUS BLD VENIPUNCTURE: CPT

## 2022-02-16 PROCEDURE — 76815 OB US LIMITED FETUS(S): CPT

## 2022-02-16 PROCEDURE — 85730 THROMBOPLASTIN TIME PARTIAL: CPT

## 2022-02-16 PROCEDURE — 86780 TREPONEMA PALLIDUM: CPT

## 2022-02-16 PROCEDURE — 82570 ASSAY OF URINE CREATININE: CPT

## 2022-02-16 PROCEDURE — 86900 BLOOD TYPING SEROLOGIC ABO: CPT

## 2022-02-16 PROCEDURE — 81001 URINALYSIS AUTO W/SCOPE: CPT

## 2022-02-16 RX ORDER — ASCORBIC ACID 60 MG
500 TABLET,CHEWABLE ORAL THREE TIMES A DAY
Refills: 0 | Status: DISCONTINUED | OUTPATIENT
Start: 2022-02-16 | End: 2022-02-16

## 2022-02-16 RX ORDER — ACETAMINOPHEN 500 MG
3 TABLET ORAL
Qty: 36 | Refills: 0
Start: 2022-02-16 | End: 2022-02-18

## 2022-02-16 RX ORDER — FERROUS SULFATE 325(65) MG
1 TABLET ORAL
Qty: 0 | Refills: 0 | DISCHARGE
Start: 2022-02-16

## 2022-02-16 RX ORDER — ASCORBIC ACID 60 MG
1 TABLET,CHEWABLE ORAL
Qty: 0 | Refills: 0 | DISCHARGE
Start: 2022-02-16

## 2022-02-16 RX ORDER — FERROUS SULFATE 325(65) MG
325 TABLET ORAL THREE TIMES A DAY
Refills: 0 | Status: DISCONTINUED | OUTPATIENT
Start: 2022-02-16 | End: 2022-02-16

## 2022-02-16 RX ORDER — IBUPROFEN 200 MG
1 TABLET ORAL
Qty: 28 | Refills: 0
Start: 2022-02-16 | End: 2022-02-22

## 2022-02-16 RX ADMIN — Medication 975 MILLIGRAM(S): at 09:05

## 2022-02-16 RX ADMIN — Medication 975 MILLIGRAM(S): at 03:24

## 2022-02-16 RX ADMIN — Medication 600 MILLIGRAM(S): at 05:49

## 2022-02-16 RX ADMIN — Medication 600 MILLIGRAM(S): at 12:46

## 2022-02-16 RX ADMIN — Medication 1 TABLET(S): at 12:46

## 2022-02-16 NOTE — DISCHARGE NOTE OB - PATIENT PORTAL LINK FT
You can access the FollowMyHealth Patient Portal offered by [facility] by registering at the following website: [email or web address]. By joining [employer]’s FollowMyHealth portal, you will also be able to view your health information using other applications (apps) compatible with our system.

## 2022-02-16 NOTE — DISCHARGE NOTE OB - MEDICATION SUMMARY - MEDICATIONS TO TAKE
I will START or STAY ON the medications listed below when I get home from the hospital:    prenatal vitamins  -- 1 tab(s) by mouth once a day  -- Indication: For home meds    acetaminophen 325 mg oral capsule  -- 3 cap(s) by mouth every 6 hours   -- Indication: For pain    ibuprofen 600 mg oral tablet  -- 1 tab(s) by mouth every 6 hours   -- Do not take this drug if you are pregnant.  It is very important that you take or use this exactly as directed.  Do not skip doses or discontinue unless directed by your doctor.  May cause drowsiness or dizziness.  Obtain medical advice before taking any non-prescription drugs as some may affect the action of this medication.  Take with food or milk.    -- Indication: For pain

## 2022-02-16 NOTE — PROGRESS NOTE ADULT - SUBJECTIVE AND OBJECTIVE BOX
DARRIN SIEGEL is a 26y  now PPD#1 s/p spontaneous vaginal delivery at 40w3d gestation, complicated by chorioamnionitis s/p amp/gent; received IM Methergine x1 for uterine atony.     S:    No acute events overnight.   The patient has no complaints.  Pain controlled with current treatment regimen.   She is ambulating without difficulty and tolerating PO.   + flatus/-BM/+ voiding   She endorses appropriate lochia, which is decreasing.   She is breastfeeding without difficulty.   She denies fevers, chills, nausea and vomiting.   She denies lightheadedness, dizziness, palpitations, chest pain and SOB.     O:    T(C): 36.6 (22 @ 03:54), Max: 37.1 (02-15-22 @ 07:57)  HR: 66 (22 @ 03:54) (63 - 90)  BP: 101/65 (22 @ 03:54) (99/68 - 146/98)  RR: 16 (22 @ 03:54) (16 - 18)  SpO2: 99% (22 @ 03:54) (97% - 100%)    Gen: NAD, AOx3  CV: RRR, S1/S2 present  Pulm: CTAB  Abdomen:  Soft, non-tender, non-distended, +bowel sounds  Uterus:  Fundus firm below umbilicus  VE:  Expected lochia  Ext:  b/l LE non-tender                           9.4    7.13  )-----------( 275      ( 2022 11:34 )             30.8         137  |  105  |  6.4<L>  ----------------------------<  82  4.2   |  20.0<L>  |  0.44<L>    Ca    8.7      2022 11:34    TPro  6.8  /  Alb  3.5  /  TBili  0.2<L>  /  DBili  x   /  AST  20  /  ALT  11  /  AlkPhos  283<H>  14           DARRIN SIEGEL is a 26y  now PPD#1 s/p spontaneous vaginal delivery at 40w3d gestation, complicated by chorioamnionitis s/p amp/gent; received IM Methergine x1 for uterine atony.     S:    No acute events overnight.   The patient has no complaints.  Pain controlled with current treatment regimen.   She is ambulating without difficulty and tolerating PO.   + flatus/-BM/+ voiding   She endorses appropriate lochia, which is decreasing.   She is bottlefeeding.  She denies fevers, chills, nausea and vomiting.   She denies lightheadedness, dizziness, palpitations, chest pain and SOB.     O:    T(C): 36.6 (22 @ 03:54), Max: 37.1 (02-15-22 @ 07:57)  HR: 66 (22 @ 03:54) (63 - 90)  BP: 101/65 (22 @ 03:54) (99/68 - 146/98)  RR: 16 (22 @ 03:54) (16 - 18)  SpO2: 99% (22 @ 03:54) (97% - 100%)    Gen: NAD, AOx3  CV: RRR, S1/S2 present  Pulm: CTAB  Abdomen:  Soft, non-tender, non-distended, +bowel sounds  Uterus:  Fundus firm below umbilicus  VE:  Expected lochia  Ext:  b/l LE non-tender                           9.4    7.13  )-----------( 275      ( 2022 11:34 )             30.8         137  |  105  |  6.4<L>  ----------------------------<  82  4.2   |  20.0<L>  |  0.44<L>    Ca    8.7      2022 11:34    TPro  6.8  /  Alb  3.5  /  TBili  0.2<L>  /  DBili  x   /  AST  20  /  ALT  11  /  AlkPhos  283<H>  14

## 2022-02-16 NOTE — PROGRESS NOTE ADULT - ASSESSMENT
A/P:  DARRIN SIEGEL is a 26y  now PPD#1 s/p spontaneous vaginal delivery at 40w3d gestation, complicated by chorioamnionitis s/p amp/gent; received IM Methergine x1 for uterine atony.   -Vital signs stable  -Hgb: 9.4 -> AM labs pending   -WBC: 7.13 -> AM Labs pending  -Voiding, tolerating PO  -Advance care as tolerated   -Continue routine postpartum care and education  -Healthy female infant  -Dispo: Patient desires discharge today, pending AM labs and attending approval.

## 2022-02-16 NOTE — DISCHARGE NOTE OB - HOSPITAL COURSE
Patient underwent a normal spontaneous vaginal delivery, complicated by chorioamnionitis, received ampicillin and gentamicin. Pain is well controlled with PRN medication. She has no difficulty with ambulation, voiding, or PO intake. Lab values and vital signs are within normal limits prior to discharge.

## 2022-02-16 NOTE — DISCHARGE NOTE OB - CARE PLAN
Principal Discharge DX:	Delivery normal  Assessment and plan of treatment:	1) Please take ibuprofen and/or Tylenol as needed for pain as prescribed.  2) Nothing in the vagina for 6 weeks (including no sex, no tampons, and no douching).  3) Please call your doctor for a follow up your postpartum appointment in 2 weeks.  4) Please continue taking vitamins postpartum. Take iron and colace for acute blood loss anemia.  5) Please call the office sooner if you have heavy vaginal bleeding, severe abdominal pain, or fever > 100.4F.  6) You may resume regular daily activity as tolerated   1

## 2022-02-16 NOTE — DISCHARGE NOTE OB - NS MD DC FALL RISK RISK
For information on Fall & Injury Prevention, visit: [email or web address] OR  [email or web address] OR  https://www.cdc.gov/steadi/patient.html

## 2022-02-16 NOTE — DISCHARGE NOTE OB - CARE PROVIDER_API CALL
Dian Mac)  Obstetrics and Gynecology  [address] [place] [address]  Phone: [phone]  Fax: [phone]  Follow Up Time:

## 2022-06-27 NOTE — PROGRESS NOTE ADULT - SUBJECTIVE AND OBJECTIVE BOX
S: Patient doing well. Minimal lochia. No complaints.    O: Vital Signs Last 24 Hrs  T(C): 36.6 (16 Feb 2022 03:54), Max: 37.1 (15 Feb 2022 07:57)  T(F): 97.8 (16 Feb 2022 03:54), Max: 98.8 (15 Feb 2022 07:57)  HR: 66 (16 Feb 2022 03:54) (63 - 90)  BP: 101/65 (16 Feb 2022 03:54) (99/68 - 142/96)  BP(mean): --  RR: 16 (16 Feb 2022 03:54) (16 - 18)  SpO2: 99% (16 Feb 2022 03:54) (97% - 100%)    Gen: NAD  Breast feeding  Abd: soft, NT, ND, fundus firm below umbilicus  lochia mild, voiding well.  Ext: no tenderness    Labs:                        9.4    7.13  )-----------( 275      ( 14 Feb 2022 11:34 )             30.8          A/P PP # 1  Doing well.  Routine post partum care.  Discharge home today.       Constitutional: - Fever, - Chills, - Anorexia, - Fatigue, - Night sweats  Eyes: - Discharge, - Irritation, - Redness, - Visual changes, - Light sensitivity, - Pain  EARS: - Ear Pain, - Tinnitus, - Decreased hearing  NOSE: - Congestion, - Epistaxis  MOUTH/THROAT: - Vocal Changes, - Drooling, - Sore throat  NECK: - Lumps, - Stiffness, - Pain  CV: - Palpitations, +Chest Pain, - Edema, - Syncope  RESP:  - Cough, - Shortness of Breath, - Dyspnea on Exertion, - Trouble speaking, - Pleuritic pain - Wheezing  GI: + Diarrhea, - Constipation, - Bloody stools, + Nausea, - Vomiting, +Abdominal Pain  : - Dysuria, -Frequency, - Hematuria, - Hesitancy, - Incontinence, - Saddle Anesthesia, - Abnormal discharge  MSK: - Myalgias, - Arthralgias, - Weakness, - Deformities, - Injuries  SKIN: - Color change, - Rash, - Swelling, - Ecchymosis, - Abrasion, - laceration  NEURO: - Change in behavior, - Dec. Alertness, - Headache, - Dizziness, - Change in speech, - Weakness, - Seizure-like activity, - Difficulty ambulating

## 2023-05-17 ENCOUNTER — ASOB RESULT (OUTPATIENT)
Age: 28
End: 2023-05-17

## 2023-05-17 ENCOUNTER — APPOINTMENT (OUTPATIENT)
Dept: MATERNAL FETAL MEDICINE | Facility: CLINIC | Age: 28
End: 2023-05-17
Payer: MEDICAID

## 2023-05-17 PROCEDURE — 99202 OFFICE O/P NEW SF 15 MIN: CPT | Mod: 95

## 2023-05-23 ENCOUNTER — APPOINTMENT (OUTPATIENT)
Dept: ANTEPARTUM | Facility: CLINIC | Age: 28
End: 2023-05-23

## 2023-06-06 ENCOUNTER — NON-APPOINTMENT (OUTPATIENT)
Age: 28
End: 2023-06-06
